# Patient Record
Sex: FEMALE | Race: WHITE
[De-identification: names, ages, dates, MRNs, and addresses within clinical notes are randomized per-mention and may not be internally consistent; named-entity substitution may affect disease eponyms.]

---

## 2017-01-17 ENCOUNTER — RX ONLY (RX ONLY)
Age: 57
End: 2017-01-17

## 2017-01-17 ENCOUNTER — DOCTOR'S OFFICE (OUTPATIENT)
Dept: URBAN - NONMETROPOLITAN AREA CLINIC 1 | Facility: CLINIC | Age: 57
Setting detail: OPHTHALMOLOGY
End: 2017-01-17
Payer: COMMERCIAL

## 2017-01-17 DIAGNOSIS — H27.03: ICD-10-CM

## 2017-01-17 DIAGNOSIS — H01.004: ICD-10-CM

## 2017-01-17 DIAGNOSIS — H01.002: ICD-10-CM

## 2017-01-17 DIAGNOSIS — H01.001: ICD-10-CM

## 2017-01-17 DIAGNOSIS — H01.005: ICD-10-CM

## 2017-01-17 DIAGNOSIS — H04.123: ICD-10-CM

## 2017-01-17 PROCEDURE — 92014 COMPRE OPH EXAM EST PT 1/>: CPT | Performed by: OPHTHALMOLOGY

## 2017-01-17 ASSESSMENT — CONFRONTATIONAL VISUAL FIELD TEST (CVF)
OD_FINDINGS: FULL
OS_FINDINGS: FULL

## 2017-01-17 ASSESSMENT — REFRACTION_MANIFEST
OS_VA2: 20/
OS_VA2: 20/
OD_CYLINDER: SPH
OD_VA1: 20/
OS_VA3: 20/
OD_VA2: 20/
OS_VA1: 20/
OS_VA2: 20/
OU_VA: 20/
OD_VA3: 20/
OD_VA1: 20/40
OS_VA1: 20/40
OD_SPHERE: +12.00
OU_VA: 20/
OD_VA1: 20/
OS_CYLINDER: SPH
OD_VA2: 20/
OD_VA3: 20/
OS_VA1: 20/
OS_VA3: 20/
OS_SPHERE: +12.00
OD_VA2: 20/
OU_VA: 20/
OD_VA3: 20/
OS_VA3: 20/

## 2017-01-17 ASSESSMENT — LID EXAM ASSESSMENTS
OD_BLEPHARITIS: 1+
OS_BLEPHARITIS: 1+

## 2017-01-17 ASSESSMENT — SUPERFICIAL PUNCTATE KERATITIS (SPK)
OS_SPK: 1+
OD_SPK: 1+

## 2017-01-17 ASSESSMENT — REFRACTION_AUTOREFRACTION
OD_AXIS: 151
OD_SPHERE: +13.00
OS_SPHERE: -0.25
OD_CYLINDER: -1.25
OS_AXIS: 18
OS_CYLINDER: -1.50

## 2017-01-17 ASSESSMENT — PUNCTA - ASSESSMENT
OS_PUNCTA: COL PLUG LLL MED
OD_PUNCTA: COL PLUG RLL MED

## 2017-01-17 ASSESSMENT — REFRACTION_CURRENTRX
OD_CYLINDER: 0.00
OD_OVR_VA: 20/
OS_VPRISM_DIRECTION: SV
OS_AXIS: 180
OS_OVR_VA: 20/
OD_VPRISM_DIRECTION: SV
OD_OVR_VA: 20/
OD_SPHERE: +14.00
OS_OVR_VA: 20/
OD_AXIS: 180
OS_OVR_VA: 20/
OS_CYLINDER: 0.00
OS_SPHERE: +14.00
OD_OVR_VA: 20/

## 2017-01-17 ASSESSMENT — SPHEQUIV_DERIVED
OD_SPHEQUIV: 12.375
OS_SPHEQUIV: -1

## 2017-01-17 ASSESSMENT — VISUAL ACUITY
OS_BCVA: 20/30-2
OD_BCVA: 20/40-1

## 2017-01-20 ENCOUNTER — OPTICAL OFFICE (OUTPATIENT)
Dept: URBAN - NONMETROPOLITAN AREA CLINIC 4 | Facility: CLINIC | Age: 57
Setting detail: OPHTHALMOLOGY
End: 2017-01-20
Payer: COMMERCIAL

## 2017-01-20 DIAGNOSIS — H52.03: ICD-10-CM

## 2017-01-20 PROCEDURE — S0500 DISPOS CONT LENS: HCPCS | Performed by: OPTOMETRIST

## 2017-11-13 ENCOUNTER — RX ONLY (RX ONLY)
Age: 57
End: 2017-11-13

## 2017-11-13 ENCOUNTER — DOCTOR'S OFFICE (OUTPATIENT)
Dept: URBAN - NONMETROPOLITAN AREA CLINIC 1 | Facility: CLINIC | Age: 57
Setting detail: OPHTHALMOLOGY
End: 2017-11-13
Payer: COMMERCIAL

## 2017-11-13 DIAGNOSIS — H04.123: ICD-10-CM

## 2017-11-13 DIAGNOSIS — H01.005: ICD-10-CM

## 2017-11-13 DIAGNOSIS — H27.03: ICD-10-CM

## 2017-11-13 DIAGNOSIS — H01.002: ICD-10-CM

## 2017-11-13 DIAGNOSIS — H01.004: ICD-10-CM

## 2017-11-13 DIAGNOSIS — H01.001: ICD-10-CM

## 2017-11-13 DIAGNOSIS — H04.122: ICD-10-CM

## 2017-11-13 PROCEDURE — 83861 MICROFLUID ANALY TEARS: CPT | Performed by: OPHTHALMOLOGY

## 2017-11-13 PROCEDURE — 92012 INTRM OPH EXAM EST PATIENT: CPT | Performed by: OPHTHALMOLOGY

## 2017-11-13 ASSESSMENT — REFRACTION_AUTOREFRACTION
OD_SPHERE: +13.00
OD_CYLINDER: -1.25
OS_SPHERE: -0.25
OS_CYLINDER: -1.50
OD_AXIS: 151
OS_AXIS: 18

## 2017-11-13 ASSESSMENT — VISUAL ACUITY
OS_BCVA: 20/25+2
OD_BCVA: 20/25+2

## 2017-11-13 ASSESSMENT — REFRACTION_MANIFEST
OS_VA2: 20/
OD_CYLINDER: SPH
OS_CYLINDER: SPH
OD_VA2: 20/
OU_VA: 20/
OD_VA3: 20/
OD_VA1: 20/
OS_VA3: 20/
OD_VA1: 20/
OD_VA1: 20/40
OU_VA: 20/
OD_VA3: 20/
OS_VA1: 20/40
OS_VA1: 20/
OS_SPHERE: +12.00
OS_VA2: 20/
OD_SPHERE: +12.00
OS_VA2: 20/
OD_VA2: 20/
OU_VA: 20/
OS_VA3: 20/
OS_VA1: 20/
OD_VA3: 20/
OD_VA2: 20/
OS_VA3: 20/

## 2017-11-13 ASSESSMENT — REFRACTION_CURRENTRX
OS_OVR_VA: 20/
OD_CYLINDER: 0.00
OD_OVR_VA: 20/
OD_OVR_VA: 20/
OD_SPHERE: +14.00
OS_SPHERE: +14.00
OD_OVR_VA: 20/
OS_VPRISM_DIRECTION: SV
OS_AXIS: 180
OS_OVR_VA: 20/
OD_VPRISM_DIRECTION: SV
OD_AXIS: 180
OS_OVR_VA: 20/
OS_CYLINDER: 0.00

## 2017-11-13 ASSESSMENT — PUNCTA - ASSESSMENT
OD_PUNCTA: COL PLUG RLL MED
OS_PUNCTA: COL PLUG LLL MED

## 2017-11-13 ASSESSMENT — LID EXAM ASSESSMENTS
OS_BLEPHARITIS: 1+
OD_BLEPHARITIS: 1+

## 2017-11-13 ASSESSMENT — CONFRONTATIONAL VISUAL FIELD TEST (CVF)
OD_FINDINGS: FULL
OS_FINDINGS: FULL

## 2017-11-13 ASSESSMENT — SPHEQUIV_DERIVED
OS_SPHEQUIV: -1
OD_SPHEQUIV: 12.375

## 2017-11-13 ASSESSMENT — SUPERFICIAL PUNCTATE KERATITIS (SPK)
OS_SPK: 1+
OD_SPK: 1+

## 2017-11-16 ENCOUNTER — DOCTOR'S OFFICE (OUTPATIENT)
Dept: URBAN - NONMETROPOLITAN AREA CLINIC 1 | Facility: CLINIC | Age: 57
Setting detail: OPHTHALMOLOGY
End: 2017-11-16
Payer: COMMERCIAL

## 2017-11-16 ENCOUNTER — RX ONLY (RX ONLY)
Age: 57
End: 2017-11-16

## 2017-11-16 DIAGNOSIS — B30.1: ICD-10-CM

## 2017-11-16 DIAGNOSIS — H04.123: ICD-10-CM

## 2017-11-16 DIAGNOSIS — B30.0: ICD-10-CM

## 2017-11-16 DIAGNOSIS — B30.2: ICD-10-CM

## 2017-11-16 DIAGNOSIS — B30.3: ICD-10-CM

## 2017-11-16 PROCEDURE — 92012 INTRM OPH EXAM EST PATIENT: CPT | Performed by: OPTOMETRIST

## 2017-11-16 ASSESSMENT — REFRACTION_AUTOREFRACTION
OS_CYLINDER: -1.50
OS_SPHERE: -0.25
OD_CYLINDER: -1.25
OD_AXIS: 151
OD_SPHERE: +13.00
OS_AXIS: 18

## 2017-11-16 ASSESSMENT — REFRACTION_CURRENTRX
OS_VPRISM_DIRECTION: SV
OD_AXIS: 180
OS_OVR_VA: 20/
OD_SPHERE: +14.00
OD_CYLINDER: 0.00
OD_OVR_VA: 20/
OD_OVR_VA: 20/
OS_OVR_VA: 20/
OS_SPHERE: +14.00
OD_VPRISM_DIRECTION: SV
OS_CYLINDER: 0.00
OS_OVR_VA: 20/
OS_AXIS: 180
OD_OVR_VA: 20/

## 2017-11-16 ASSESSMENT — REFRACTION_MANIFEST
OD_SPHERE: +12.00
OS_VA2: 20/
OD_VA3: 20/
OD_VA2: 20/
OS_SPHERE: +12.00
OS_VA3: 20/
OS_VA3: 20/
OS_VA1: 20/
OS_VA2: 20/
OS_VA2: 20/
OD_VA2: 20/
OD_VA1: 20/
OU_VA: 20/
OD_VA1: 20/40
OS_CYLINDER: SPH
OS_VA3: 20/
OD_VA2: 20/
OU_VA: 20/
OU_VA: 20/
OD_CYLINDER: SPH
OD_VA3: 20/
OS_VA1: 20/40
OD_VA3: 20/
OD_VA1: 20/
OS_VA1: 20/

## 2017-11-16 ASSESSMENT — SUPERFICIAL PUNCTATE KERATITIS (SPK)
OS_SPK: 1+
OD_SPK: 1+

## 2017-11-16 ASSESSMENT — LID EXAM ASSESSMENTS
OD_BLEPHARITIS: 1+
OS_BLEPHARITIS: 1+

## 2017-11-16 ASSESSMENT — SPHEQUIV_DERIVED
OD_SPHEQUIV: 12.375
OS_SPHEQUIV: -1

## 2017-11-16 ASSESSMENT — PUNCTA - ASSESSMENT
OD_PUNCTA: COL PLUG RLL MED
OS_PUNCTA: COL PLUG LLL MED

## 2017-11-16 ASSESSMENT — VISUAL ACUITY
OD_BCVA: 20/50
OS_BCVA: 20/30+2

## 2018-06-11 ENCOUNTER — DOCTOR'S OFFICE (OUTPATIENT)
Dept: URBAN - NONMETROPOLITAN AREA CLINIC 1 | Facility: CLINIC | Age: 58
Setting detail: OPHTHALMOLOGY
End: 2018-06-11
Payer: COMMERCIAL

## 2018-06-11 DIAGNOSIS — H01.001: ICD-10-CM

## 2018-06-11 DIAGNOSIS — B30.3: ICD-10-CM

## 2018-06-11 DIAGNOSIS — J01.90: ICD-10-CM

## 2018-06-11 DIAGNOSIS — B30.1: ICD-10-CM

## 2018-06-11 DIAGNOSIS — H53.123: ICD-10-CM

## 2018-06-11 DIAGNOSIS — H57.11: ICD-10-CM

## 2018-06-11 DIAGNOSIS — H01.005: ICD-10-CM

## 2018-06-11 DIAGNOSIS — H01.002: ICD-10-CM

## 2018-06-11 DIAGNOSIS — H04.123: ICD-10-CM

## 2018-06-11 DIAGNOSIS — B30.0: ICD-10-CM

## 2018-06-11 DIAGNOSIS — B30.2: ICD-10-CM

## 2018-06-11 DIAGNOSIS — H01.004: ICD-10-CM

## 2018-06-11 PROCEDURE — 92134 CPTRZ OPH DX IMG PST SGM RTA: CPT | Performed by: OPHTHALMOLOGY

## 2018-06-11 PROCEDURE — 92014 COMPRE OPH EXAM EST PT 1/>: CPT | Performed by: OPHTHALMOLOGY

## 2018-06-11 ASSESSMENT — CONFRONTATIONAL VISUAL FIELD TEST (CVF)
OS_FINDINGS: FULL
OD_FINDINGS: FULL

## 2018-06-11 ASSESSMENT — PUNCTA - ASSESSMENT
OS_PUNCTA: COL PLUG LLL MED
OD_PUNCTA: COL PLUG RLL MED

## 2018-06-11 ASSESSMENT — LID EXAM ASSESSMENTS
OD_BLEPHARITIS: 1+
OS_BLEPHARITIS: 1+

## 2018-06-11 ASSESSMENT — SUPERFICIAL PUNCTATE KERATITIS (SPK)
OS_SPK: 1+
OD_SPK: 1+

## 2018-06-18 ASSESSMENT — SPHEQUIV_DERIVED
OD_SPHEQUIV: 12.375
OS_SPHEQUIV: -1

## 2018-06-18 ASSESSMENT — REFRACTION_MANIFEST
OD_SPHERE: +12.00
OD_VA1: 20/
OD_VA2: 20/
OS_VA3: 20/
OS_VA2: 20/
OS_VA1: 20/
OS_SPHERE: +12.00
OD_VA3: 20/
OS_VA3: 20/
OS_VA1: 20/40
OD_VA2: 20/
OD_VA2: 20/
OS_VA3: 20/
OD_VA3: 20/
OU_VA: 20/
OS_VA2: 20/
OD_VA1: 20/40
OS_CYLINDER: SPH
OD_VA3: 20/
OD_CYLINDER: SPH
OU_VA: 20/
OD_VA1: 20/
OU_VA: 20/
OS_VA2: 20/
OS_VA1: 20/

## 2018-06-18 ASSESSMENT — REFRACTION_CURRENTRX
OD_OVR_VA: 20/
OS_AXIS: 180
OD_SPHERE: +14.00
OD_CYLINDER: 0.00
OD_AXIS: 180
OS_VPRISM_DIRECTION: SV
OS_SPHERE: +14.00
OS_OVR_VA: 20/
OD_OVR_VA: 20/
OS_OVR_VA: 20/
OS_CYLINDER: 0.00
OD_OVR_VA: 20/
OD_VPRISM_DIRECTION: SV
OS_OVR_VA: 20/

## 2018-06-18 ASSESSMENT — REFRACTION_AUTOREFRACTION
OD_CYLINDER: -1.25
OS_SPHERE: -0.25
OS_AXIS: 18
OD_SPHERE: +13.00
OS_CYLINDER: -1.50
OD_AXIS: 151

## 2018-06-18 ASSESSMENT — VISUAL ACUITY
OD_BCVA: 20/25-1
OS_BCVA: 20/25-2

## 2018-10-30 ENCOUNTER — DOCTOR'S OFFICE (OUTPATIENT)
Dept: URBAN - NONMETROPOLITAN AREA CLINIC 1 | Facility: CLINIC | Age: 58
Setting detail: OPHTHALMOLOGY
End: 2018-10-30
Payer: COMMERCIAL

## 2018-10-30 DIAGNOSIS — H18.221: ICD-10-CM

## 2018-10-30 DIAGNOSIS — H01.005: ICD-10-CM

## 2018-10-30 DIAGNOSIS — H01.002: ICD-10-CM

## 2018-10-30 DIAGNOSIS — H01.004: ICD-10-CM

## 2018-10-30 DIAGNOSIS — H57.11: ICD-10-CM

## 2018-10-30 DIAGNOSIS — H53.123: ICD-10-CM

## 2018-10-30 DIAGNOSIS — J01.90: ICD-10-CM

## 2018-10-30 DIAGNOSIS — H20.9: ICD-10-CM

## 2018-10-30 DIAGNOSIS — H35.371: ICD-10-CM

## 2018-10-30 DIAGNOSIS — H27.03: ICD-10-CM

## 2018-10-30 DIAGNOSIS — H01.001: ICD-10-CM

## 2018-10-30 DIAGNOSIS — H04.123: ICD-10-CM

## 2018-10-30 PROBLEM — B30.3 VIRAL CONJUNCTIVITIS ; BOTH EYES: Status: RESOLVED | Noted: 2017-11-13 | Resolved: 2018-10-30

## 2018-10-30 PROBLEM — B30.0 VIRAL CONJUNCTIVITIS ; BOTH EYES: Status: RESOLVED | Noted: 2017-11-13 | Resolved: 2018-10-30

## 2018-10-30 PROBLEM — B30.1 VIRAL CONJUNCTIVITIS ; BOTH EYES: Status: RESOLVED | Noted: 2017-11-13 | Resolved: 2018-10-30

## 2018-10-30 PROBLEM — B30.2 VIRAL CONJUNCTIVITIS ; BOTH EYES: Status: RESOLVED | Noted: 2017-11-13 | Resolved: 2018-10-30

## 2018-10-30 PROCEDURE — 76512 OPH US DX B-SCAN: CPT | Performed by: OPHTHALMOLOGY

## 2018-10-30 PROCEDURE — 92286 ANT SGM IMG I&R SPECLR MIC: CPT | Performed by: OPHTHALMOLOGY

## 2018-10-30 PROCEDURE — 92134 CPTRZ OPH DX IMG PST SGM RTA: CPT | Performed by: OPHTHALMOLOGY

## 2018-10-30 PROCEDURE — 92014 COMPRE OPH EXAM EST PT 1/>: CPT | Performed by: OPHTHALMOLOGY

## 2018-10-30 ASSESSMENT — REFRACTION_CURRENTRX
OD_OVR_VA: 20/
OS_OVR_VA: 20/
OS_SPHERE: +14.00
OD_OVR_VA: 20/
OS_CYLINDER: 0.00
OD_CYLINDER: 0.00
OD_AXIS: 180
OD_SPHERE: +14.00
OS_VPRISM_DIRECTION: SV
OD_VPRISM_DIRECTION: SV
OD_OVR_VA: 20/
OS_AXIS: 180

## 2018-10-30 ASSESSMENT — REFRACTION_MANIFEST
OS_VA2: 20/
OD_VA3: 20/
OS_SPHERE: +12.00
OD_SPHERE: +12.00
OS_VA1: 20/40
OS_VA2: 20/
OS_CYLINDER: SPH
OU_VA: 20/
OD_VA2: 20/
OD_CYLINDER: SPH
OS_VA3: 20/
OD_VA2: 20/
OU_VA: 20/
OD_VA1: 20/
OS_VA3: 20/
OS_VA1: 20/
OD_VA3: 20/
OD_VA1: 20/40

## 2018-10-30 ASSESSMENT — SUPERFICIAL PUNCTATE KERATITIS (SPK)
OS_SPK: 1+
OD_SPK: 1+

## 2018-10-30 ASSESSMENT — LID EXAM ASSESSMENTS
OD_BLEPHARITIS: 1+
OS_BLEPHARITIS: 1+

## 2018-10-30 ASSESSMENT — REFRACTION_AUTOREFRACTION
OS_CYLINDER: -1.50
OD_CYLINDER: -1.25
OD_SPHERE: +13.00
OS_SPHERE: -0.25
OS_AXIS: 18
OD_AXIS: 151

## 2018-10-30 ASSESSMENT — SPHEQUIV_DERIVED
OD_SPHEQUIV: 12.375
OS_SPHEQUIV: -1

## 2018-10-30 ASSESSMENT — CONFRONTATIONAL VISUAL FIELD TEST (CVF)
OD_FINDINGS: FULL
OS_FINDINGS: FULL

## 2018-10-30 ASSESSMENT — PUNCTA - ASSESSMENT
OD_PUNCTA: COL PLUG RLL MED
OS_PUNCTA: COL PLUG LLL MED

## 2018-10-30 ASSESSMENT — VISUAL ACUITY
OS_BCVA: 20/100+1
OD_BCVA: 20/25-1

## 2018-10-30 ASSESSMENT — CORNEAL EDEMA CLINICAL DESCRIPTION: OD_CORNEALEDEMA: 1+

## 2018-11-07 ENCOUNTER — RX ONLY (RX ONLY)
Age: 58
End: 2018-11-07

## 2018-11-07 ENCOUNTER — DOCTOR'S OFFICE (OUTPATIENT)
Dept: URBAN - NONMETROPOLITAN AREA CLINIC 1 | Facility: CLINIC | Age: 58
Setting detail: OPHTHALMOLOGY
End: 2018-11-07
Payer: COMMERCIAL

## 2018-11-07 DIAGNOSIS — H35.371: ICD-10-CM

## 2018-11-07 DIAGNOSIS — H18.221: ICD-10-CM

## 2018-11-07 DIAGNOSIS — H20.9: ICD-10-CM

## 2018-11-07 DIAGNOSIS — H04.123: ICD-10-CM

## 2018-11-07 PROCEDURE — 92286 ANT SGM IMG I&R SPECLR MIC: CPT | Performed by: OPHTHALMOLOGY

## 2018-11-07 PROCEDURE — 92014 COMPRE OPH EXAM EST PT 1/>: CPT | Performed by: OPHTHALMOLOGY

## 2018-11-07 ASSESSMENT — REFRACTION_CURRENTRX
OD_OVR_VA: 20/
OS_AXIS: 180
OD_OVR_VA: 20/
OD_SPHERE: +14.00
OS_OVR_VA: 20/
OD_CYLINDER: 0.00
OD_OVR_VA: 20/
OD_AXIS: 180
OS_OVR_VA: 20/
OS_SPHERE: +14.00
OS_OVR_VA: 20/
OD_VPRISM_DIRECTION: SV
OS_CYLINDER: 0.00
OS_VPRISM_DIRECTION: SV

## 2018-11-07 ASSESSMENT — PACHYMETRY
OD_CT_UM: 476
OD_CT_CORRECTION: 5

## 2018-11-07 ASSESSMENT — KERATOMETRY: METHOD_AUTO_MANUAL: AUTO

## 2018-11-07 ASSESSMENT — SUPERFICIAL PUNCTATE KERATITIS (SPK)
OD_SPK: ABSENT
OS_SPK: 1+

## 2018-11-07 ASSESSMENT — REFRACTION_AUTOREFRACTION
OS_AXIS: 18
OD_CYLINDER: -1.50
OD_AXIS: 173
OD_SPHERE: +15.75
OS_SPHERE: -0.25
OS_CYLINDER: -1.50

## 2018-11-07 ASSESSMENT — LID EXAM ASSESSMENTS
OD_BLEPHARITIS: 1+
OS_BLEPHARITIS: 1+

## 2018-11-07 ASSESSMENT — VISUAL ACUITY
OS_BCVA: UNABLE
OD_BCVA: 20/25

## 2018-11-07 ASSESSMENT — REFRACTION_MANIFEST
OD_CYLINDER: SPH
OD_VA3: 20/
OS_VA3: 20/
OD_VA3: 20/
OS_VA3: 20/
OS_CYLINDER: SPH
OD_VA2: 20/
OS_SPHERE: +12.00
OD_SPHERE: +12.00
OD_VA1: 20/
OS_VA2: 20/
OD_VA1: 20/40
OS_VA1: 20/40
OU_VA: 20/
OS_VA1: 20/
OS_VA2: 20/
OU_VA: 20/
OD_VA2: 20/

## 2018-11-07 ASSESSMENT — PUNCTA - ASSESSMENT
OD_PUNCTA: COL PLUG RLL MED
OS_PUNCTA: COL PLUG LLL MED

## 2018-11-07 ASSESSMENT — SPHEQUIV_DERIVED
OD_SPHEQUIV: 15
OS_SPHEQUIV: -1

## 2018-11-15 ENCOUNTER — DOCTOR'S OFFICE (OUTPATIENT)
Dept: URBAN - NONMETROPOLITAN AREA CLINIC 1 | Facility: CLINIC | Age: 58
Setting detail: OPHTHALMOLOGY
End: 2018-11-15
Payer: COMMERCIAL

## 2018-11-15 DIAGNOSIS — H04.123: ICD-10-CM

## 2018-11-15 DIAGNOSIS — H20.9: ICD-10-CM

## 2018-11-15 DIAGNOSIS — H18.221: ICD-10-CM

## 2018-11-15 PROCEDURE — 92235 FLUORESCEIN ANGRPH MLTIFRAME: CPT | Performed by: OPHTHALMOLOGY

## 2018-11-15 PROCEDURE — 92250 FUNDUS PHOTOGRAPHY W/I&R: CPT | Performed by: OPHTHALMOLOGY

## 2018-11-15 PROCEDURE — 92014 COMPRE OPH EXAM EST PT 1/>: CPT | Performed by: OPHTHALMOLOGY

## 2018-11-15 ASSESSMENT — REFRACTION_MANIFEST
OD_CYLINDER: SPH
OS_VA1: 20/40
OD_VA3: 20/
OS_CYLINDER: SPH
OD_VA2: 20/
OD_VA2: 20/
OD_VA1: 20/40
OS_VA3: 20/
OU_VA: 20/
OD_VA1: 20/
OS_VA2: 20/
OS_VA3: 20/
OD_VA3: 20/
OS_VA2: 20/
OU_VA: 20/
OD_SPHERE: +12.00
OS_VA1: 20/
OS_SPHERE: +12.00

## 2018-11-15 ASSESSMENT — REFRACTION_AUTOREFRACTION
OS_CYLINDER: -1.50
OD_AXIS: 173
OS_AXIS: 18
OD_CYLINDER: -1.50
OD_SPHERE: +15.75
OS_SPHERE: -0.25

## 2018-11-15 ASSESSMENT — CONFRONTATIONAL VISUAL FIELD TEST (CVF)
OS_FINDINGS: FULL
OD_FINDINGS: FULL

## 2018-11-15 ASSESSMENT — SUPERFICIAL PUNCTATE KERATITIS (SPK)
OD_SPK: ABSENT
OS_SPK: 1+

## 2018-11-15 ASSESSMENT — SPHEQUIV_DERIVED
OS_SPHEQUIV: -1
OD_SPHEQUIV: 15

## 2018-11-15 ASSESSMENT — REFRACTION_CURRENTRX
OS_OVR_VA: 20/
OD_OVR_VA: 20/
OD_OVR_VA: 20/
OS_OVR_VA: 20/
OS_CYLINDER: 0.00
OS_VPRISM_DIRECTION: SV
OS_AXIS: 180
OD_AXIS: 180
OD_CYLINDER: 0.00
OS_SPHERE: +14.00
OD_SPHERE: +14.00
OS_OVR_VA: 20/
OD_OVR_VA: 20/
OD_VPRISM_DIRECTION: SV

## 2018-11-15 ASSESSMENT — VISUAL ACUITY
OD_BCVA: 20/20-1
OS_BCVA: LP

## 2018-11-15 ASSESSMENT — LID EXAM ASSESSMENTS
OS_BLEPHARITIS: 1+
OD_BLEPHARITIS: 1+

## 2018-11-15 ASSESSMENT — PUNCTA - ASSESSMENT
OS_PUNCTA: COL PLUG LLL MED
OD_PUNCTA: COL PLUG RLL MED

## 2018-11-19 ENCOUNTER — DOCTOR'S OFFICE (OUTPATIENT)
Dept: URBAN - NONMETROPOLITAN AREA CLINIC 1 | Facility: CLINIC | Age: 58
Setting detail: OPHTHALMOLOGY
End: 2018-11-19
Payer: COMMERCIAL

## 2018-11-19 DIAGNOSIS — H04.123: ICD-10-CM

## 2018-11-19 DIAGNOSIS — H18.221: ICD-10-CM

## 2018-11-19 DIAGNOSIS — H20.9: ICD-10-CM

## 2018-11-19 PROCEDURE — 92012 INTRM OPH EXAM EST PATIENT: CPT | Performed by: OPHTHALMOLOGY

## 2018-11-19 ASSESSMENT — REFRACTION_AUTOREFRACTION
OD_SPHERE: +15.75
OD_CYLINDER: -1.50
OS_CYLINDER: -1.50
OD_AXIS: 173
OS_SPHERE: -0.25
OS_AXIS: 18

## 2018-11-19 ASSESSMENT — REFRACTION_CURRENTRX
OD_CYLINDER: 0.00
OS_SPHERE: +14.00
OD_OVR_VA: 20/
OS_CYLINDER: 0.00
OD_AXIS: 180
OD_SPHERE: +14.00
OD_VPRISM_DIRECTION: SV
OD_OVR_VA: 20/
OS_AXIS: 180
OS_OVR_VA: 20/
OS_VPRISM_DIRECTION: SV
OS_OVR_VA: 20/
OS_OVR_VA: 20/
OD_OVR_VA: 20/

## 2018-11-19 ASSESSMENT — REFRACTION_MANIFEST
OS_VA2: 20/
OD_VA3: 20/
OS_VA3: 20/
OS_VA1: 20/
OD_SPHERE: +12.00
OD_VA2: 20/
OS_VA2: 20/
OS_SPHERE: +12.00
OD_VA1: 20/
OU_VA: 20/
OS_VA3: 20/
OD_VA1: 20/40
OD_CYLINDER: SPH
OS_CYLINDER: SPH
OU_VA: 20/
OD_VA2: 20/
OS_VA1: 20/40
OD_VA3: 20/

## 2018-11-19 ASSESSMENT — SUPERFICIAL PUNCTATE KERATITIS (SPK)
OD_SPK: ABSENT
OS_SPK: 1+

## 2018-11-19 ASSESSMENT — SPHEQUIV_DERIVED
OS_SPHEQUIV: -1
OD_SPHEQUIV: 15

## 2018-11-19 ASSESSMENT — VISUAL ACUITY
OD_BCVA: 20/20-1
OS_BCVA: 20/40-2

## 2018-11-19 ASSESSMENT — LID EXAM ASSESSMENTS
OD_BLEPHARITIS: 1+
OS_BLEPHARITIS: 1+

## 2018-11-19 ASSESSMENT — CONFRONTATIONAL VISUAL FIELD TEST (CVF)
OD_FINDINGS: FULL
OS_FINDINGS: FULL

## 2018-11-19 ASSESSMENT — PUNCTA - ASSESSMENT
OS_PUNCTA: COL PLUG LLL MED
OD_PUNCTA: COL PLUG RLL MED

## 2018-11-27 ENCOUNTER — OPTICAL OFFICE (OUTPATIENT)
Dept: URBAN - NONMETROPOLITAN AREA CLINIC 4 | Facility: CLINIC | Age: 58
Setting detail: OPHTHALMOLOGY
End: 2018-11-27

## 2018-11-27 DIAGNOSIS — H52.03: ICD-10-CM

## 2018-11-27 PROCEDURE — V2520 CONTACT LENS HYDROPHILIC: HCPCS | Performed by: OPTOMETRIST

## 2018-12-07 ENCOUNTER — DOCTOR'S OFFICE (OUTPATIENT)
Dept: URBAN - NONMETROPOLITAN AREA CLINIC 1 | Facility: CLINIC | Age: 58
Setting detail: OPHTHALMOLOGY
End: 2018-12-07
Payer: COMMERCIAL

## 2018-12-07 DIAGNOSIS — H20.9: ICD-10-CM

## 2018-12-07 DIAGNOSIS — H18.221: ICD-10-CM

## 2018-12-07 DIAGNOSIS — H04.123: ICD-10-CM

## 2018-12-07 PROBLEM — H01.001 BLEPHARITIS; RIGHT UPPER LID, RIGHT LOWER LID, LEFT UPPER LID, LEFT LOWER LID: Status: ACTIVE | Noted: 2017-11-13

## 2018-12-07 PROBLEM — H35.371 PUCKERING OF MACULA; RIGHT EYE: Status: ACTIVE | Noted: 2018-10-30

## 2018-12-07 PROBLEM — H01.002 BLEPHARITIS; RIGHT UPPER LID, RIGHT LOWER LID, LEFT UPPER LID, LEFT LOWER LID: Status: ACTIVE | Noted: 2017-11-13

## 2018-12-07 PROBLEM — H01.004 BLEPHARITIS; RIGHT UPPER LID, RIGHT LOWER LID, LEFT UPPER LID, LEFT LOWER LID: Status: ACTIVE | Noted: 2017-11-13

## 2018-12-07 PROBLEM — H57.11: Status: ACTIVE | Noted: 2018-06-11

## 2018-12-07 PROBLEM — J01.90 ACUTE SINUSITIS ; BOTH EYES: Status: ACTIVE | Noted: 2018-06-11

## 2018-12-07 PROBLEM — H53.123 SUBJECTIVE VISUAL DISTURBANCE; BOTH EYES: Status: ACTIVE | Noted: 2018-06-11

## 2018-12-07 PROBLEM — H01.005 BLEPHARITIS; RIGHT UPPER LID, RIGHT LOWER LID, LEFT UPPER LID, LEFT LOWER LID: Status: ACTIVE | Noted: 2017-11-13

## 2018-12-07 PROBLEM — H53.123 VISUAL LOSS TRANSIENT; BOTH EYES: Status: ACTIVE | Noted: 2018-06-11

## 2018-12-07 PROCEDURE — 92012 INTRM OPH EXAM EST PATIENT: CPT | Performed by: OPHTHALMOLOGY

## 2018-12-07 ASSESSMENT — REFRACTION_MANIFEST
OS_CYLINDER: SPH
OD_VA3: 20/
OU_VA: 20/
OS_SPHERE: +12.00
OS_VA1: 20/40
OS_VA2: 20/
OD_VA2: 20/
OD_SPHERE: +12.00
OU_VA: 20/
OD_VA1: 20/40
OS_VA2: 20/
OD_VA1: 20/
OS_VA3: 20/
OD_VA3: 20/
OS_VA1: 20/
OD_CYLINDER: SPH
OS_VA3: 20/
OD_VA2: 20/

## 2018-12-07 ASSESSMENT — LID EXAM ASSESSMENTS
OS_BLEPHARITIS: 1+
OD_BLEPHARITIS: 1+

## 2018-12-07 ASSESSMENT — SUPERFICIAL PUNCTATE KERATITIS (SPK)
OS_SPK: 1+
OD_SPK: ABSENT

## 2018-12-07 ASSESSMENT — SPHEQUIV_DERIVED
OS_SPHEQUIV: -1
OD_SPHEQUIV: 15

## 2018-12-07 ASSESSMENT — REFRACTION_CURRENTRX
OD_OVR_VA: 20/
OD_VPRISM_DIRECTION: SV
OS_SPHERE: +14.00
OS_OVR_VA: 20/
OS_OVR_VA: 20/
OS_CYLINDER: 0.00
OS_OVR_VA: 20/
OD_SPHERE: +14.00
OD_CYLINDER: 0.00
OS_VPRISM_DIRECTION: SV
OD_AXIS: 180
OS_AXIS: 180

## 2018-12-07 ASSESSMENT — VISUAL ACUITY
OS_BCVA: 20/30-2
OD_BCVA: 20/25-1

## 2018-12-07 ASSESSMENT — PUNCTA - ASSESSMENT
OD_PUNCTA: COL PLUG RLL MED
OS_PUNCTA: COL PLUG LLL MED

## 2018-12-07 ASSESSMENT — REFRACTION_AUTOREFRACTION
OS_CYLINDER: -1.50
OS_SPHERE: -0.25
OD_AXIS: 173
OD_CYLINDER: -1.50
OS_AXIS: 18
OD_SPHERE: +15.75

## 2023-03-12 ENCOUNTER — OFFICE VISIT (OUTPATIENT)
Dept: URGENT CARE | Facility: CLINIC | Age: 63
End: 2023-03-12

## 2023-03-12 VITALS
HEIGHT: 60 IN | OXYGEN SATURATION: 98 % | WEIGHT: 160 LBS | RESPIRATION RATE: 18 BRPM | SYSTOLIC BLOOD PRESSURE: 144 MMHG | DIASTOLIC BLOOD PRESSURE: 86 MMHG | BODY MASS INDEX: 31.41 KG/M2 | TEMPERATURE: 99.6 F | HEART RATE: 109 BPM

## 2023-03-12 DIAGNOSIS — J06.9 ACUTE URI: Primary | ICD-10-CM

## 2023-03-12 RX ORDER — BENZONATATE 200 MG/1
200 CAPSULE ORAL 3 TIMES DAILY PRN
Qty: 20 CAPSULE | Refills: 0 | Status: SHIPPED | OUTPATIENT
Start: 2023-03-12

## 2023-03-12 RX ORDER — LISINOPRIL 5 MG/1
5 TABLET ORAL DAILY
COMMUNITY

## 2023-03-12 NOTE — PROGRESS NOTES
[address]        NAME: Akira May is a 66 y o  female  : 1960    MRN: [id number]  DATE: 2023  TIME: 9:39 AM    Assessment and Plan   Acute URI [J06 9]  1  Acute URI  benzonatate (TESSALON) 200 MG capsule        Discussed problem with patient  1 days worth of symptoms consistent with viral etiology advised conservative management by using Vladislav and due to history of high blood pressure and the patient should not be taking Sudafed  Advised plain Mucinex or Flonase for nasal congestion  Should be pushing fluids and prescribing Tessalon Perles for cough complaints  Follow-up with PCP if symptoms do not improve and report to the ER symptoms worsen    Patient Instructions       Follow up with PCP in 3-5 days  Proceed to  ER if symptoms worsen  Chief Complaint     Chief Complaint   Patient presents with   • Cold Like Symptoms     Cough, congestion, headache since yesterday - taking day time and night time OTC cold medication without relief  History of Present Illness       Cough  This is a new problem  The current episode started today  The problem has been unchanged  The problem occurs constantly  The cough is non-productive  Associated symptoms include chills, headaches, nasal congestion, postnasal drip and rhinorrhea  Pertinent negatives include no chest pain, ear pain, fever, myalgias, sore throat, shortness of breath or wheezing  She has tried rest for the symptoms  The treatment provided mild relief  There is no history of asthma, bronchitis, COPD or pneumonia  Review of Systems   Review of Systems   Constitutional: Positive for appetite change and chills  Negative for fatigue and fever  HENT: Positive for congestion, postnasal drip and rhinorrhea  Negative for ear pain, sinus pressure, sinus pain and sore throat  Respiratory: Positive for cough  Negative for shortness of breath, wheezing and stridor      Cardiovascular: Negative for chest pain and palpitations  Gastrointestinal: Negative for abdominal pain, constipation, diarrhea, nausea and vomiting  Musculoskeletal: Negative for myalgias  Neurological: Positive for headaches  Negative for dizziness, syncope and light-headedness  Current Medications       Current Outpatient Medications:   •  benzonatate (TESSALON) 200 MG capsule, Take 1 capsule (200 mg total) by mouth 3 (three) times a day as needed for cough, Disp: 20 capsule, Rfl: 0  •  lisinopril (ZESTRIL) 5 mg tablet, Take 5 mg by mouth daily, Disp: , Rfl:     Current Allergies     Allergies as of 2023   • (No Known Allergies)            The following portions of the patient's history were reviewed and updated as appropriate: allergies, current medications, past family history, past medical history, past social history, past surgical history and problem list      Past Medical History:   Diagnosis Date   • Hypertension        Past Surgical History:   Procedure Laterality Date   • CATARACT EXTRACTION Bilateral    •  SECTION N/A     x4   • CHOLECYSTECTOMY         Family History   Problem Relation Age of Onset   • No Known Problems Mother    • No Known Problems Father          Medications have been verified  Objective   /86   Pulse (!) 109   Temp 99 6 °F (37 6 °C)   Resp 18   Ht 5' (1 524 m)   Wt 72 6 kg (160 lb)   SpO2 98%   BMI 31 25 kg/m²        Physical Exam     Physical Exam  Vitals and nursing note reviewed  Constitutional:       General: She is not in acute distress  Appearance: Normal appearance  She is normal weight  She is not ill-appearing, toxic-appearing or diaphoretic  HENT:      Head: Normocephalic  Right Ear: Tympanic membrane, ear canal and external ear normal  There is no impacted cerumen  Left Ear: Tympanic membrane, ear canal and external ear normal  There is no impacted cerumen  Nose: Congestion and rhinorrhea present        Mouth/Throat:      Mouth: Mucous membranes are moist       Pharynx: Oropharynx is clear  No oropharyngeal exudate or posterior oropharyngeal erythema  Eyes:      General:         Right eye: No discharge  Left eye: No discharge  Extraocular Movements: Extraocular movements intact  Conjunctiva/sclera: Conjunctivae normal       Pupils: Pupils are equal, round, and reactive to light  Neck:      Vascular: No carotid bruit  Cardiovascular:      Rate and Rhythm: Normal rate and regular rhythm  Pulses: Normal pulses  Heart sounds: Normal heart sounds  No murmur heard  No friction rub  No gallop  Pulmonary:      Effort: Pulmonary effort is normal  No respiratory distress  Breath sounds: Normal breath sounds  No stridor  No wheezing, rhonchi or rales  Chest:      Chest wall: No tenderness  Musculoskeletal:         General: No swelling, tenderness or signs of injury  Normal range of motion  Cervical back: Normal range of motion and neck supple  No rigidity or tenderness  Skin:     General: Skin is warm and dry  Capillary Refill: Capillary refill takes less than 2 seconds  Coloration: Skin is not jaundiced or pale  Findings: No erythema  Neurological:      General: No focal deficit present  Mental Status: She is alert and oriented to person, place, and time     Psychiatric:         Mood and Affect: Mood normal          Behavior: Behavior normal

## 2024-03-12 ENCOUNTER — HOSPITAL ENCOUNTER (EMERGENCY)
Facility: HOSPITAL | Age: 64
Discharge: HOME | End: 2024-03-12
Attending: EMERGENCY MEDICINE
Payer: COMMERCIAL

## 2024-03-12 ENCOUNTER — APPOINTMENT (EMERGENCY)
Dept: RADIOLOGY | Facility: HOSPITAL | Age: 64
End: 2024-03-12
Payer: COMMERCIAL

## 2024-03-12 VITALS
DIASTOLIC BLOOD PRESSURE: 102 MMHG | WEIGHT: 184.53 LBS | HEIGHT: 60 IN | HEART RATE: 69 BPM | BODY MASS INDEX: 36.23 KG/M2 | RESPIRATION RATE: 18 BRPM | OXYGEN SATURATION: 99 % | TEMPERATURE: 96.7 F | SYSTOLIC BLOOD PRESSURE: 166 MMHG

## 2024-03-12 DIAGNOSIS — S40.022A CONTUSION OF MULTIPLE SITES OF LEFT UPPER EXTREMITY, INITIAL ENCOUNTER: ICD-10-CM

## 2024-03-12 DIAGNOSIS — S83.92XA SPRAIN OF LEFT KNEE: ICD-10-CM
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
## 2024-03-12 PROCEDURE — 73564 X-RAY EXAM KNEE 4 OR MORE: CPT

## 2024-03-12 PROCEDURE — 99283 EMERGENCY DEPT VISIT LOW MDM: CPT

## 2024-03-12 PROCEDURE — 73030 X-RAY EXAM OF SHOULDER: CPT

## 2024-03-12 PROCEDURE — 99284 EMERGENCY DEPT VISIT MOD MDM: CPT | Performed by: EMERGENCY MEDICINE

## 2024-03-12 PROCEDURE — 73110 X-RAY EXAM OF WRIST: CPT

## 2024-03-12 PROCEDURE — 73080 X-RAY EXAM OF ELBOW: CPT

## 2024-03-12 RX ORDER — ACETAMINOPHEN 325 MG/1
975 TABLET ORAL ONCE
Status: COMPLETED | OUTPATIENT
Start: 2024-03-12 | End: 2024-03-12

## 2024-03-12 RX ADMIN — ACETAMINOPHEN 325MG 975 MG: 325 TABLET ORAL at 22:05

## 2024-03-12 NOTE — Clinical Note
Jessica Hoffman was seen and treated in our emergency department on 3/12/2024.            off work 3/13/24 - 3/14/24    Diagnosis:     Jessica  .    She may return on this date:          If you have any questions or concerns, please don't hesitate to call.      Ayaz Reich, DO    ______________________________           _______________          _______________  Hospital Representative                              Date                                Time

## 2024-03-13 NOTE — ED PROVIDER NOTES
"History  Chief Complaint   Patient presents with    Fall     Per pt she fell tonight and thinks her leg \"gave out.\" + left knee pain. Denies hitting her head or having any LOC.       Patient is a 64-year-old female presents the emergency department due to fall she was walking and leg gave out and she fell onto her left side injuring her left knee wrist elbow and shoulder no strike on head or loss of consciousness no anticoagulants no other injury no numbness or weakness.        History provided by:  Patient  Fall  Mechanism of injury: fall    Injury location:  Leg  Leg injury location:  L knee  Time since incident:  2 hours  Associated symptoms: no abdominal pain, no chest pain, no headaches, no nausea and no vomiting        Prior to Admission Medications   Prescriptions Last Dose Informant Patient Reported? Taking?   benzonatate (TESSALON) 200 MG capsule   No No   Sig: Take 1 capsule (200 mg total) by mouth 3 (three) times a day as needed for cough   lisinopril (ZESTRIL) 5 mg tablet   Yes No   Sig: Take 5 mg by mouth daily      Facility-Administered Medications: None       Past Medical History:   Diagnosis Date    Hypertension        Past Surgical History:   Procedure Laterality Date    CATARACT EXTRACTION Bilateral      SECTION N/A     x4    CHOLECYSTECTOMY      EYE SURGERY Bilateral        Family History   Problem Relation Age of Onset    No Known Problems Mother     No Known Problems Father      I have reviewed and agree with the history as documented.    E-Cigarette/Vaping    E-Cigarette Use Never User      E-Cigarette/Vaping Substances    Nicotine No     THC No     CBD No     Flavoring No     Other No     Unknown No      Social History     Tobacco Use    Smoking status: Never    Smokeless tobacco: Never   Vaping Use    Vaping status: Never Used   Substance Use Topics    Alcohol use: Not Currently    Drug use: Not Currently       Review of Systems   Constitutional:  Negative for activity change, " appetite change and fever.   HENT:  Negative for congestion and sore throat.    Respiratory:  Negative for cough and shortness of breath.    Cardiovascular:  Negative for chest pain.   Gastrointestinal:  Negative for abdominal pain, diarrhea, nausea and vomiting.   Musculoskeletal:         Left shoulder elbow wrist and knee pain   Skin:  Negative for rash.   Neurological:  Negative for weakness, numbness and headaches.   All other systems reviewed and are negative.      Physical Exam  Physical Exam  Vitals and nursing note reviewed.   Constitutional:       General: She is not in acute distress.     Appearance: Normal appearance.   HENT:      Head: Normocephalic and atraumatic.      Nose: Nose normal.      Mouth/Throat:      Mouth: Mucous membranes are moist.   Eyes:      Conjunctiva/sclera: Conjunctivae normal.      Pupils: Pupils are equal, round, and reactive to light.   Cardiovascular:      Rate and Rhythm: Normal rate.   Pulmonary:      Effort: Pulmonary effort is normal. No respiratory distress.   Musculoskeletal:         General: Normal range of motion.      Left shoulder: Tenderness present. No deformity.      Left elbow: No deformity. Normal range of motion. Tenderness present.      Left wrist: Tenderness present. No deformity. Normal range of motion.      Cervical back: Normal range of motion.      Left knee: Swelling present. No deformity. Tenderness present.   Skin:     General: Skin is warm and dry.      Findings: No rash.   Neurological:      General: No focal deficit present.      Mental Status: She is alert and oriented to person, place, and time.         Vital Signs  ED Triage Vitals   Temperature Pulse Respirations Blood Pressure SpO2   03/12/24 2154 03/12/24 2154 03/12/24 2154 03/12/24 2154 03/12/24 2154   (!) 96.7 °F (35.9 °C) 74 16 (!) 214/88 97 %      Temp Source Heart Rate Source Patient Position - Orthostatic VS BP Location FiO2 (%)   03/12/24 2154 03/12/24 2154 03/12/24 2230 03/12/24 2154 --    Temporal Monitor Lying Right arm       Pain Score       03/12/24 2154       10 - Worst Possible Pain           Vitals:    03/12/24 2154 03/12/24 2230   BP: (!) 214/88 (!) 166/102   Pulse: 74 69   Patient Position - Orthostatic VS:  Lying         Visual Acuity      ED Medications  Medications   acetaminophen (TYLENOL) tablet 975 mg (975 mg Oral Given 3/12/24 2205)       Diagnostic Studies  Results Reviewed       None                   XR wrist 3+ views LEFT   ED Interpretation by Ayaz Reich DO (03/12 2230)   No acute fracture or dislocation      XR elbow 3+ views LEFT   ED Interpretation by Ayaz Reich DO (03/12 2230)   No acute fracture or dislocation calcific tendinitis      XR shoulder 2+ views LEFT   ED Interpretation by Ayaz Reich DO (03/12 2230)   No acute fracture or dislocation      XR knee 4+ views left injury   ED Interpretation by Ayaz Reich DO (03/12 2230)   No acute fracture or dislocation                 Procedures  Procedures         ED Course                               SBIRT 22yo+      Flowsheet Row Most Recent Value   Initial Alcohol Screen: US AUDIT-C     1. How often do you have a drink containing alcohol? 0 Filed at: 03/12/2024 2155   2. How many drinks containing alcohol do you have on a typical day you are drinking?  0 Filed at: 03/12/2024 2155   3b. FEMALE Any Age, or MALE 65+: How often do you have 4 or more drinks on one occassion? 0 Filed at: 03/12/2024 2157   Audit-C Score 0 Filed at: 03/12/2024 2157   JADE: How many times in the past year have you...    Used an illegal drug or used a prescription medication for non-medical reasons? Never Filed at: 03/12/2024 2155                      Medical Decision Making  Differential diagnosis included but not limited to fracture dislocation contusion sprain.    Patient is clinically and hemodynamically and neurologically stable in the emergency department.  Imaging in the ED reveals no evidence of acute fracture or dislocation  for now recommended ice rest supportive care Tylenol as needed and prompt follow-up with PCP and orthopedics as needed for further evaluation and treatment return precautions and anticipatory guidance discussed.      Problems Addressed:  Contusion of multiple sites of left upper extremity, initial encounter: acute illness or injury  Fall, initial encounter: acute illness or injury  Sprain of left knee: acute illness or injury    Amount and/or Complexity of Data Reviewed  Radiology: ordered and independent interpretation performed. Decision-making details documented in ED Course.    Risk  OTC drugs.             Disposition  Final diagnoses:   Fall, initial encounter   Contusion of multiple sites of left upper extremity, initial encounter   Sprain of left knee     Time reflects when diagnosis was documented in both MDM as applicable and the Disposition within this note       Time User Action Codes Description Comment    3/12/2024 10:32 PM Ayaz Reich [W19.XXXA] Fall, initial encounter     3/12/2024 10:32 PM Ayaz Reich [S40.022A] Contusion of multiple sites of left upper extremity, initial encounter     3/12/2024 10:32 PM Ayaz Reich [S83.92XA] Sprain of left knee           ED Disposition       ED Disposition   Discharge    Condition   Stable    Date/Time   Tue Mar 12, 2024 2232    Comment   Jessica Hoffman discharge to home/self care.                   Follow-up Information       Follow up With Specialties Details Why Contact Info Additional Information    [facility] Schedule an appointment as soon as possible for a visit  As needed, If symptoms worsen [address]  [address] [facility] [address]  [phone] [facility] [address], [address], [place], [address]   [phone]             Discharge Medication List as of 3/12/2024 10:33 PM         CONTINUE these medications which have NOT CHANGED    Details   benzonatate (TESSALON) 200 MG capsule Take 1 capsule (200 mg total) by mouth 3 (three) times a day as needed for cough, Starting Sun 3/12/2023, Normal      lisinopril (ZESTRIL) 5 mg tablet Take 5 mg by mouth daily, Historical Med             No discharge procedures on file.    PDMP Review       None            ED Provider  Electronically Signed by             Ayaz Reich DO  03/13/24 0158

## 2025-02-16 ENCOUNTER — OFFICE VISIT (OUTPATIENT)
Dept: URGENT CARE | Facility: CLINIC | Age: 65
End: 2025-02-16
Payer: COMMERCIAL

## 2025-02-16 VITALS
BODY MASS INDEX: 35.87 KG/M2 | HEART RATE: 92 BPM | TEMPERATURE: 96.7 F | RESPIRATION RATE: 18 BRPM | DIASTOLIC BLOOD PRESSURE: 80 MMHG | SYSTOLIC BLOOD PRESSURE: 140 MMHG | WEIGHT: 190 LBS | OXYGEN SATURATION: 97 % | HEIGHT: 61 IN

## 2025-02-16 DIAGNOSIS — H57.89 IRRITATION OF RIGHT EYE: Primary | ICD-10-CM

## 2025-02-16 PROCEDURE — G0382 LEV 3 HOSP TYPE B ED VISIT: HCPCS

## 2025-02-16 PROCEDURE — S9083 URGENT CARE CENTER GLOBAL: HCPCS

## 2025-02-16 RX ORDER — TETRACAINE HYDROCHLORIDE 5 MG/ML
2 SOLUTION OPHTHALMIC ONCE
Status: COMPLETED | OUTPATIENT
Start: 2025-02-16 | End: 2025-02-16

## 2025-02-16 RX ADMIN — TETRACAINE HYDROCHLORIDE 2 DROP: 5 SOLUTION OPHTHALMIC at 11:33

## 2025-02-16 NOTE — LETTER
February 16, 2025     Patient: Jessica Hoffman   YOB: 1960   Date of Visit: 2/16/2025       To Whom It May Concern:    It is my medical opinion that Jessica Hoffman may return to work on 2/16/2025 .    If you have any questions or concerns, please don't hesitate to call.         Sincerely,        Arias Perez PA-C    CC: No Recipients

## 2025-02-16 NOTE — PROGRESS NOTES
[facility]        NAME: Jessica Hoffman is a 64 y.o. female  : 1960    MRN: [id number]  DATE: 2025  TIME: 12:22 PM    Assessment and Plan   Irritation of right eye [H57.89]  1. Irritation of right eye  tetracaine 0.5 % ophthalmic solution 2 drop    fluorescein sodium sterile 1 mg ophthalmic strip 1 strip    tobramycin (TOBREX) 0.3 % OINT        No corneal abrasion or FB noted on examination.  Due to patient trying OTC measures for 3 days with no relief, will prescribe Tobramycin for any possible bacterial component.    Patient Instructions     Use eye drops as prescribed  Warm compress to wipe eyes   Wash hands frequently   Tylenol/ibuprofen as needed for fever or pain  Follow up with PCP in 3-5 days.  Proceed to  ER if symptoms worsen.     If tests are performed, our office will contact you with results only if changes need to made to the care plan discussed with you at the visit. You can review your full results on [facility].    Chief Complaint     Chief Complaint   Patient presents with    Eye Problem     Started 3 days ago  Right eye problem  Seen at eye doctor 3 days ago  Eye doctor recommended warm compresses, and eye drops  Request note for work         History of Present Illness       Eye Problem   The right eye is affected. This is a new problem. Episode onset: 3 days. Associated symptoms include an eye discharge (watery) and eye redness. Pertinent negatives include no itching or photophobia.   She was seen at her eye doctor for this who recommended warm compresses and eye drops.     Review of Systems   Review of Systems   Eyes:  Positive for pain (burning), discharge (watery) and redness. Negative for photophobia, itching and visual disturbance.         Current Medications       Current Outpatient Medications:     lisinopril (ZESTRIL) 5 mg tablet, Take 5 mg by mouth daily, Disp: , Rfl:     tobramycin (TOBREX) 0.3 % OINT, Administer 0.5 inches to the right eye 3  "(three) times a day for 7 days, Disp: 3.2 g, Rfl: 0    benzonatate (TESSALON) 200 MG capsule, Take 1 capsule (200 mg total) by mouth 3 (three) times a day as needed for cough (Patient not taking: Reported on 2025), Disp: 20 capsule, Rfl: 0  No current facility-administered medications for this visit.    Current Allergies     Allergies as of 2025 - Reviewed 2025   Allergen Reaction Noted    Alcohol - food allergy Hives 2024    Hydrocodone Hives 2024    Motrin [ibuprofen] Hives 2024    Percocet [oxycodone-acetaminophen] Hives 2024            The following portions of the patient's history were reviewed and updated as appropriate: allergies, current medications, past family history, past medical history, past social history, past surgical history and problem list.     Past Medical History:   Diagnosis Date    Hypertension        Past Surgical History:   Procedure Laterality Date    CATARACT EXTRACTION Bilateral      SECTION N/A     x4    CHOLECYSTECTOMY      EYE SURGERY Bilateral        Family History   Problem Relation Age of Onset    No Known Problems Mother     No Known Problems Father          Medications have been verified.        Objective   /80   Pulse 92   Temp (!) 96.7 °F (35.9 °C)   Resp 18   Ht 5' 1\" (1.549 m)   Wt 86.2 kg (190 lb)   LMP  (LMP Unknown)   SpO2 97%   BMI 35.90 kg/m²        Physical Exam     Physical Exam  Constitutional:       Appearance: Normal appearance.   Eyes:      General:         Right eye: No discharge.         Left eye: No discharge.      Extraocular Movements: Extraocular movements intact.      Conjunctiva/sclera:      Right eye: Right conjunctiva is injected.      Pupils: Pupils are equal, round, and reactive to light.   Cardiovascular:      Rate and Rhythm: Normal rate.      Pulses: Normal pulses.   Pulmonary:      Effort: Pulmonary effort is normal.   Neurological:      General: No focal deficit present.      Mental " Status: She is alert and oriented to person, place, and time. Mental status is at baseline.